# Patient Record
Sex: MALE | Race: WHITE | HISPANIC OR LATINO | Employment: FULL TIME | URBAN - METROPOLITAN AREA
[De-identification: names, ages, dates, MRNs, and addresses within clinical notes are randomized per-mention and may not be internally consistent; named-entity substitution may affect disease eponyms.]

---

## 2022-06-12 ENCOUNTER — HOSPITAL ENCOUNTER (EMERGENCY)
Facility: MEDICAL CENTER | Age: 27
End: 2022-06-12
Attending: EMERGENCY MEDICINE
Payer: OTHER MISCELLANEOUS

## 2022-06-12 ENCOUNTER — APPOINTMENT (OUTPATIENT)
Dept: RADIOLOGY | Facility: MEDICAL CENTER | Age: 27
End: 2022-06-12
Attending: EMERGENCY MEDICINE
Payer: OTHER MISCELLANEOUS

## 2022-06-12 VITALS
SYSTOLIC BLOOD PRESSURE: 118 MMHG | TEMPERATURE: 98.4 F | HEART RATE: 98 BPM | BODY MASS INDEX: 24.16 KG/M2 | HEIGHT: 65 IN | DIASTOLIC BLOOD PRESSURE: 71 MMHG | OXYGEN SATURATION: 97 % | WEIGHT: 145 LBS | RESPIRATION RATE: 16 BRPM

## 2022-06-12 DIAGNOSIS — F10.920 ALCOHOLIC INTOXICATION WITHOUT COMPLICATION (HCC): ICD-10-CM
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
## 2022-06-12 PROCEDURE — 99284 EMERGENCY DEPT VISIT MOD MDM: CPT

## 2022-06-12 PROCEDURE — 71045 X-RAY EXAM CHEST 1 VIEW: CPT

## 2022-06-12 PROCEDURE — 307740 HCHG GREEN TRAUMA TEAM SERVICES
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
## 2025-02-20 ENCOUNTER — OFFICE VISIT (OUTPATIENT)
Dept: URGENT CARE | Facility: PHYSICIAN GROUP | Age: 30
End: 2025-02-20
Payer: COMMERCIAL

## 2025-02-20 VITALS
OXYGEN SATURATION: 98 % | HEIGHT: 66 IN | RESPIRATION RATE: 14 BRPM | HEART RATE: 84 BPM | DIASTOLIC BLOOD PRESSURE: 64 MMHG | BODY MASS INDEX: 25.55 KG/M2 | WEIGHT: 159 LBS | SYSTOLIC BLOOD PRESSURE: 96 MMHG | TEMPERATURE: 98.2 F

## 2025-02-20 DIAGNOSIS — H57.89 EYE SWELLING: ICD-10-CM

## 2025-02-20 DIAGNOSIS — H00.014 HORDEOLUM EXTERNUM OF LEFT UPPER EYELID: ICD-10-CM

## 2025-02-20 PROCEDURE — 99203 OFFICE O/P NEW LOW 30 MIN: CPT

## 2025-02-20 PROCEDURE — 3078F DIAST BP <80 MM HG: CPT

## 2025-02-20 PROCEDURE — 1126F AMNT PAIN NOTED NONE PRSNT: CPT

## 2025-02-20 PROCEDURE — 3074F SYST BP LT 130 MM HG: CPT

## 2025-02-20 RX ORDER — DEXAMETHASONE SODIUM PHOSPHATE 10 MG/ML
10 INJECTION, SOLUTION INTRA-ARTICULAR; INTRALESIONAL; INTRAMUSCULAR; INTRAVENOUS; SOFT TISSUE ONCE
Status: COMPLETED | OUTPATIENT
Start: 2025-02-20 | End: 2025-02-20

## 2025-02-20 RX ORDER — ERYTHROMYCIN 5 MG/G
1 OINTMENT OPHTHALMIC 4 TIMES DAILY
Qty: 3.5 G | Refills: 0 | Status: SHIPPED | OUTPATIENT
Start: 2025-02-20 | End: 2025-03-02

## 2025-02-20 RX ADMIN — DEXAMETHASONE SODIUM PHOSPHATE 10 MG: 10 INJECTION, SOLUTION INTRA-ARTICULAR; INTRALESIONAL; INTRAMUSCULAR; INTRAVENOUS; SOFT TISSUE at 14:30

## 2025-02-20 ASSESSMENT — ENCOUNTER SYMPTOMS
ABDOMINAL PAIN: 0
WEAKNESS: 0
EYE REDNESS: 0
PHOTOPHOBIA: 0
CHILLS: 0
FEVER: 0
HEADACHES: 0
SHORTNESS OF BREATH: 0
EYE PAIN: 0
WEIGHT LOSS: 0
COUGH: 0
SORE THROAT: 0
BLURRED VISION: 0
DIZZINESS: 0
MYALGIAS: 0
VOMITING: 0
NAUSEA: 0
PALPITATIONS: 0
NECK PAIN: 0
STRIDOR: 0
DOUBLE VISION: 0
BLOOD IN STOOL: 0
DIARRHEA: 0
EYE DISCHARGE: 0
SINUS PAIN: 0

## 2025-02-20 ASSESSMENT — PAIN SCALES - GENERAL: PAINLEVEL_OUTOF10: NO PAIN

## 2025-02-20 ASSESSMENT — VISUAL ACUITY: OU: 1

## 2025-02-20 NOTE — PROGRESS NOTES
"I was present with a nurse practitioner student who participated in the documentation of this note. I personally saw and evaluated the patient and performed my own history and examination. I discussed the case with the nurse practitioner student. I have reviewed, verified, and revised the note as necessary and agree with the content and plan as written by the nurse practitioner student.       Electronically signed by DYLAN Morrison     Jovanny Sanders is a 29 y.o. male who presents with Eye Swelling (Left eye swelling x 4 days )    HPI  Jovanny reports to urgent care for concern of eye swelling for 4 days. He describes the eye as \"irritated\" and does not recall a provoking factor. Reports it is more swollen in the morning and less in the evening, denies drainage. Reports using hot compresses with some relief. Denies changes in vision, pain, allergies, injury to eye, or trouble opening eye in mornings. Denies pain with EOM.     Review of Systems   Constitutional:  Negative for chills, fever, malaise/fatigue and weight loss.   HENT:  Negative for congestion, ear discharge, ear pain, hearing loss, nosebleeds, sinus pain, sore throat and tinnitus.    Eyes:  Negative for blurred vision, double vision, photophobia, pain, discharge and redness.        Reports swelling and redness of upper right eyelid   Respiratory:  Negative for cough, shortness of breath and stridor.    Cardiovascular:  Negative for chest pain and palpitations.   Gastrointestinal:  Negative for abdominal pain, blood in stool, diarrhea, nausea and vomiting.   Musculoskeletal:  Negative for joint pain, myalgias and neck pain.   Skin:  Negative for itching and rash.   Neurological:  Negative for dizziness, weakness and headaches.   Endo/Heme/Allergies:  Negative for environmental allergies.     History reviewed. No pertinent past medical history.     History reviewed. No pertinent surgical history.     Patient has no " "known allergies.     Current Outpatient Medications:     erythromycin 5 MG/GM Ointment, Apply 1 Application to left eye 4 times a day for 10 days., Disp: 3.5 g, Rfl: 0  Decadron) injection (check route below) 10 mg, 10 mg, Oral, Once,      Social History     Tobacco Use    Smoking status: Never    Smokeless tobacco: Never   Vaping Use    Vaping status: Never Used   Substance Use Topics    Alcohol use: Yes     History reviewed. No pertinent family history.     Medications, Allergies, and current problem list reviewed today in Epic.      Objective     BP 96/64 (BP Location: Right arm, Patient Position: Sitting, BP Cuff Size: Adult)   Pulse 84   Temp 36.8 °C (98.2 °F) (Temporal)   Resp 14   Ht 1.676 m (5' 6\")   Wt 72.1 kg (159 lb)   SpO2 98%   BMI 25.66 kg/m²      Physical Exam  Vitals reviewed.   Constitutional:       General: He is not in acute distress.     Appearance: Normal appearance. He is normal weight.   HENT:      Head: Normocephalic and atraumatic. No right periorbital erythema or left periorbital erythema.      Jaw: There is normal jaw occlusion. No tenderness, swelling, pain on movement or malocclusion.      Salivary Glands: Right salivary gland is not diffusely enlarged or tender. Left salivary gland is not diffusely enlarged or tender.      Right Ear: Tympanic membrane, ear canal and external ear normal. There is no impacted cerumen.      Left Ear: Tympanic membrane, ear canal and external ear normal. There is no impacted cerumen.      Nose: Nose normal. No congestion or rhinorrhea.      Right Sinus: No maxillary sinus tenderness or frontal sinus tenderness.      Left Sinus: No maxillary sinus tenderness or frontal sinus tenderness.      Mouth/Throat:      Mouth: Mucous membranes are moist.      Pharynx: Oropharynx is clear. Uvula midline. No oropharyngeal exudate, posterior oropharyngeal erythema or uvula swelling.      Tonsils: No tonsillar abscesses.   Eyes:      General: Lids are everted, no " foreign bodies appreciated. Vision grossly intact. Gaze aligned appropriately. No allergic shiner or visual field deficit.        Right eye: No foreign body, discharge or hordeolum.         Left eye: Hordeolum present.No foreign body or discharge.      Extraocular Movements: Extraocular movements intact.      Right eye: Normal extraocular motion and no nystagmus.      Left eye: Normal extraocular motion and no nystagmus.      Conjunctiva/sclera:      Right eye: Right conjunctiva is not injected. No exudate.     Left eye: Left conjunctiva is not injected. No exudate.     Pupils: Pupils are equal, round, and reactive to light.      Right eye: Pupil is round, reactive and not sluggish.      Left eye: Pupil is round, reactive and not sluggish.      Funduscopic exam:     Right eye: Red reflex present.         Left eye: Red reflex present.     Comments: Localized, tender, erythematous elevated eruption to left upper eyelid. No periorbital swelling.          Neck:      Trachea: Trachea normal. No abnormal tracheal secretions or tracheal deviation.   Cardiovascular:      Rate and Rhythm: Normal rate and regular rhythm.      Pulses: Normal pulses.      Heart sounds: Normal heart sounds.   Pulmonary:      Effort: Pulmonary effort is normal. No tachypnea, accessory muscle usage, prolonged expiration, respiratory distress or retractions.      Breath sounds: Normal breath sounds. No stridor, decreased air movement or transmitted upper airway sounds.   Musculoskeletal:         General: Normal range of motion.      Cervical back: Full passive range of motion without pain and normal range of motion. No rigidity or tenderness. Normal range of motion.   Lymphadenopathy:      Cervical: No cervical adenopathy.   Skin:     General: Skin is warm and dry.      Findings: No rash.   Neurological:      Mental Status: He is alert. Mental status is at baseline.   Psychiatric:         Mood and Affect: Mood normal.         Behavior: Behavior  normal.         Thought Content: Thought content normal.       Assessment & Plan       1. Hordeolum externum of left upper eyelid   - erythromycin 5 MG/GM Ointment; Apply 1 Application to left eye 4 times a day for 10 days.  Dispense: 3.5 g; Refill: 0    2. Eye swelling   - erythromycin 5 MG/GM Ointment; Apply 1 Application to left eye 4 times a day for 10 days.  Dispense: 3.5 g; Refill: 0  - dexamethasone (Decadron) injection (check route below) 10 mg       MDM/Comments:   History and physical most consistent with uncomplicated hordeolum externum. No visual changes or eye pain. Will treat with Erythromycin opthalmic ointment. Patient advised to practice strict eyelid hygiene and continue warm compresses to eye.     Limited ability of urgent care to perform a more in depth eye examination discussed with patient, patient verbalizes understanding of when to present to emergency room or call 911. Patient is in agreement with the plan of care.      Illness progression and alarm symptoms discussed with patient, emphasizing low threshold for returning to clinic/emergency department for worsening symptoms. Patient is agreeable to the plan and verbalizes understanding, and will follow up if warranted.        Differential diagnosis, natural history, supportive care, and indications for immediate follow-up discussed.       Follow-up as needed if symptoms worsen or fail to improve to PCP, Urgent care or Emergency Room.                                     Electronically signed by DYLAN Morrison

## 2025-02-20 NOTE — LETTER
[facility] [place]  [address]  [place] [address]     February 20, 2025    Patient: Jovanny Sanders   YOB: 1995   Date of Visit: 2/20/2025       To Whom It May Concern:    Jovanny Sanders was seen and treated in our department on 2/20/2025.     Please excuse Jovanny from work 2/20/25.           Sincerely,     VIOLETTA Mccracken.